# Patient Record
Sex: MALE | Race: WHITE | NOT HISPANIC OR LATINO | URBAN - METROPOLITAN AREA
[De-identification: names, ages, dates, MRNs, and addresses within clinical notes are randomized per-mention and may not be internally consistent; named-entity substitution may affect disease eponyms.]

---

## 2019-08-19 ENCOUNTER — EMERGENCY (EMERGENCY)
Facility: HOSPITAL | Age: 29
LOS: 0 days | Discharge: HOME | End: 2019-08-19
Attending: EMERGENCY MEDICINE | Admitting: EMERGENCY MEDICINE
Payer: MEDICAID

## 2019-08-19 VITALS
DIASTOLIC BLOOD PRESSURE: 81 MMHG | OXYGEN SATURATION: 97 % | SYSTOLIC BLOOD PRESSURE: 141 MMHG | TEMPERATURE: 98 F | HEART RATE: 118 BPM | RESPIRATION RATE: 18 BRPM

## 2019-08-19 VITALS — HEART RATE: 100 BPM
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
## 2019-08-19 DIAGNOSIS — S99.919A UNSPECIFIED INJURY OF UNSPECIFIED ANKLE, INITIAL ENCOUNTER: ICD-10-CM
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
## 2019-08-19 DIAGNOSIS — S82.892A OTHER FRACTURE OF LEFT LOWER LEG, INITIAL ENCOUNTER FOR CLOSED FRACTURE: ICD-10-CM
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
## 2019-08-19 PROCEDURE — 99284 EMERGENCY DEPT VISIT MOD MDM: CPT

## 2019-08-19 NOTE — ED ADULT NURSE NOTE - OBJECTIVE STATEMENT
Patient c/o left ankle pain x3 weeks. Patient fell at work and pain increased. Cast present to left foot unable to assess cap refill, CMS.Cast placed at urgent care- patient was told he had left ankle fracture.

## 2019-08-19 NOTE — ED PROVIDER NOTE - PROVIDER TOKENS
PROVIDER:[TOKEN:[[id number]],FOLLOWUP:[1-3 Days]],PROVIDER:[TOKEN:[[id number]],FOLLOWUP:[1-3 Days]],PROVIDER:[TOKEN:[[id number]],FOLLOWUP:[1-3 Days]]

## 2019-08-19 NOTE — ED PROVIDER NOTE - ATTENDING CONTRIBUTION TO CARE
HPI-As noted above, interviewed the patient myself & agreed w/ findings, additionally: 29M with no significant pmh presents with L ankle fx that was diagnosed out pt, and was sent to f.u with ortho. Pt came to the ER due to lack compatibility with the orthopedic insurance. Currently pt has not pain or any other complaints.     ROS- As noted above     Vital Signs: I have reviewed the initial VS.     Musculoskeletal: Left ankle- in splint, distal pulses and sensation present,     Pt has been screened and deemed not to have any emergencies currently. Pt will need outpt ortho followup.     dc Home with ortho f.u

## 2019-08-19 NOTE — ED ADULT NURSE NOTE - CHPI ED NUR SYMPTOMS NEG
no numbness/no back pain/no bruising/no deformity/no fever/no stiffness/no abrasion/no tingling/no weakness

## 2019-08-19 NOTE — ED PROVIDER NOTE - OBJECTIVE STATEMENT
29M with no significant pmh presents with L ankle fx diagnosed at [facility] [place]. PT referred to orthopedics but they did not accept his insurance and so he reported here. Denies numbness, coldness in extremities, other complaints.

## 2019-08-19 NOTE — ED PROVIDER NOTE - PHYSICAL EXAMINATION
CONSTITUTIONAL: Well-developed; well-nourished; in no acute distress.   SKIN: warm, dry  HEAD: Normocephalic; atraumatic.  EYES: PERRL, EOMI, no conjunctival erythema  ENT: No nasal discharge; airway clear.  NECK: Supple; non tender.  CARD: S1, S2 normal; no murmurs, gallops, or rubs. Regular rate and rhythm.   RESP: No wheezes, rales or rhonchi.  ABD: soft ntnd  EXT: Normal ROM.  No clubbing, cyanosis or edema. Splint in place over L ankle. distal sensation, motor function and pulses 2+ bilaterally in pt and dp  LYMPH: No acute cervical adenopathy.  NEURO: Alert, oriented, grossly unremarkable  PSYCH: Cooperative, appropriate.

## 2019-08-19 NOTE — ED PROVIDER NOTE - CARE PROVIDER_API CALL
Christo Castillo)  Orthopaedic Surgery  [address], [address]  [place] [address]  Phone: [phone]  Fax: [phone]  Follow Up Time: 1-3 Days    Krysta Garcia)  Orthopaedic Surgery  [address] [place] [address]  Phone: [phone]  Fax: [phone]  Follow Up Time: 1-3 Days    Grant Alves)  Orthopaedic Surgery; Sports Medicine  [address] [place] [address]  Phone: [phone]  Fax: [phone]  Follow Up Time: 1-3 Days

## 2019-08-19 NOTE — ED PROVIDER NOTE - CARE PROVIDERS DIRECT ADDRESSES
,DirectAddress_Unknown,[email or web address],DirectAddress_Unknown

## 2019-08-23 ENCOUNTER — INPATIENT (INPATIENT)
Facility: HOSPITAL | Age: 29
LOS: 3 days | Discharge: HOME | End: 2019-08-27
Attending: INTERNAL MEDICINE | Admitting: INTERNAL MEDICINE

## 2019-08-23 VITALS
WEIGHT: 199.96 LBS | DIASTOLIC BLOOD PRESSURE: 82 MMHG | HEART RATE: 83 BPM | RESPIRATION RATE: 16 BRPM | SYSTOLIC BLOOD PRESSURE: 142 MMHG | HEIGHT: 72 IN | TEMPERATURE: 98 F

## 2019-08-23 DIAGNOSIS — G47.00 INSOMNIA, UNSPECIFIED: ICD-10-CM

## 2019-08-23 DIAGNOSIS — F41.9 ANXIETY DISORDER, UNSPECIFIED: ICD-10-CM

## 2019-08-23 DIAGNOSIS — F15.20 OTHER STIMULANT DEPENDENCE, UNCOMPLICATED: ICD-10-CM

## 2019-08-23 DIAGNOSIS — F90.9 ATTENTION-DEFICIT HYPERACTIVITY DISORDER, UNSPECIFIED TYPE: ICD-10-CM

## 2019-08-23 DIAGNOSIS — F11.20 OPIOID DEPENDENCE, UNCOMPLICATED: ICD-10-CM

## 2019-08-23 DIAGNOSIS — S82.892A OTHER FRACTURE OF LEFT LOWER LEG, INITIAL ENCOUNTER FOR CLOSED FRACTURE: ICD-10-CM

## 2019-08-23 DIAGNOSIS — F13.20 SEDATIVE, HYPNOTIC OR ANXIOLYTIC DEPENDENCE, UNCOMPLICATED: ICD-10-CM

## 2019-08-23 DIAGNOSIS — I10 ESSENTIAL (PRIMARY) HYPERTENSION: ICD-10-CM

## 2019-08-23 DIAGNOSIS — S82.892A OTHER FRACTURE OF LEFT LOWER LEG, INITIAL ENCOUNTER FOR CLOSED FRACTURE: Chronic | ICD-10-CM

## 2019-08-23 LAB
ALBUMIN SERPL ELPH-MCNC: 4.8 G/DL — SIGNIFICANT CHANGE UP (ref 3.5–5.2)
ALP SERPL-CCNC: 63 U/L — SIGNIFICANT CHANGE UP (ref 30–115)
ALT FLD-CCNC: 150 U/L — HIGH (ref 0–41)
AMPHET UR-MCNC: NEGATIVE — SIGNIFICANT CHANGE UP
ANION GAP SERPL CALC-SCNC: 14 MMOL/L — SIGNIFICANT CHANGE UP (ref 7–14)
APPEARANCE UR: ABNORMAL
AST SERPL-CCNC: 100 U/L — HIGH (ref 0–41)
BACTERIA # UR AUTO: ABNORMAL
BARBITURATES UR SCN-MCNC: NEGATIVE — SIGNIFICANT CHANGE UP
BASOPHILS # BLD AUTO: 0.07 K/UL — SIGNIFICANT CHANGE UP (ref 0–0.2)
BASOPHILS NFR BLD AUTO: 1.2 % — HIGH (ref 0–1)
BENZODIAZ UR-MCNC: POSITIVE
BILIRUB SERPL-MCNC: 0.8 MG/DL — SIGNIFICANT CHANGE UP (ref 0.2–1.2)
BILIRUB UR-MCNC: ABNORMAL
BUN SERPL-MCNC: 11 MG/DL — SIGNIFICANT CHANGE UP (ref 10–20)
CALCIUM SERPL-MCNC: 9.8 MG/DL — SIGNIFICANT CHANGE UP (ref 8.5–10.1)
CHLORIDE SERPL-SCNC: 97 MMOL/L — LOW (ref 98–110)
CHOLEST SERPL-MCNC: 212 MG/DL — HIGH (ref 100–200)
CO2 SERPL-SCNC: 27 MMOL/L — SIGNIFICANT CHANGE UP (ref 17–32)
COCAINE METAB.OTHER UR-MCNC: NEGATIVE — SIGNIFICANT CHANGE UP
COLOR SPEC: YELLOW — SIGNIFICANT CHANGE UP
COMMENT - URINE: SIGNIFICANT CHANGE UP
CREAT SERPL-MCNC: 1 MG/DL — SIGNIFICANT CHANGE UP (ref 0.7–1.5)
DIFF PNL FLD: NEGATIVE — SIGNIFICANT CHANGE UP
DRUG SCREEN 1, URINE RESULT: SIGNIFICANT CHANGE UP
EOSINOPHIL # BLD AUTO: 0.03 K/UL — SIGNIFICANT CHANGE UP (ref 0–0.7)
EOSINOPHIL NFR BLD AUTO: 0.5 % — SIGNIFICANT CHANGE UP (ref 0–8)
EPI CELLS # UR: ABNORMAL /HPF
ESTIMATED AVERAGE GLUCOSE: 117 MG/DL — HIGH (ref 68–114)
GGT SERPL-CCNC: 15 U/L — SIGNIFICANT CHANGE UP (ref 1–40)
GLUCOSE SERPL-MCNC: 78 MG/DL — SIGNIFICANT CHANGE UP (ref 70–99)
GLUCOSE UR QL: NEGATIVE MG/DL — SIGNIFICANT CHANGE UP
HBA1C BLD-MCNC: 5.7 % — HIGH (ref 4–5.6)
HCT VFR BLD CALC: 41 % — LOW (ref 42–52)
HDLC SERPL-MCNC: 23 MG/DL — LOW
HGB BLD-MCNC: 13.7 G/DL — LOW (ref 14–18)
IMM GRANULOCYTES NFR BLD AUTO: 0.3 % — SIGNIFICANT CHANGE UP (ref 0.1–0.3)
KETONES UR-MCNC: 160
LEUKOCYTE ESTERASE UR-ACNC: NEGATIVE — SIGNIFICANT CHANGE UP
LIPID PNL WITH DIRECT LDL SERPL: 170 MG/DL — HIGH (ref 4–129)
LYMPHOCYTES # BLD AUTO: 2.04 K/UL — SIGNIFICANT CHANGE UP (ref 1.2–3.4)
LYMPHOCYTES # BLD AUTO: 34.6 % — SIGNIFICANT CHANGE UP (ref 20.5–51.1)
MAGNESIUM SERPL-MCNC: 1.9 MG/DL — SIGNIFICANT CHANGE UP (ref 1.8–2.4)
MCHC RBC-ENTMCNC: 26.6 PG — LOW (ref 27–31)
MCHC RBC-ENTMCNC: 33.4 G/DL — SIGNIFICANT CHANGE UP (ref 32–37)
MCV RBC AUTO: 79.6 FL — LOW (ref 80–94)
METHADONE UR-MCNC: NEGATIVE — SIGNIFICANT CHANGE UP
MONOCYTES # BLD AUTO: 0.36 K/UL — SIGNIFICANT CHANGE UP (ref 0.1–0.6)
MONOCYTES NFR BLD AUTO: 6.1 % — SIGNIFICANT CHANGE UP (ref 1.7–9.3)
NEUTROPHILS # BLD AUTO: 3.38 K/UL — SIGNIFICANT CHANGE UP (ref 1.4–6.5)
NEUTROPHILS NFR BLD AUTO: 57.3 % — SIGNIFICANT CHANGE UP (ref 42.2–75.2)
NITRITE UR-MCNC: NEGATIVE — SIGNIFICANT CHANGE UP
NRBC # BLD: 0 /100 WBCS — SIGNIFICANT CHANGE UP (ref 0–0)
OPIATES UR-MCNC: NEGATIVE — SIGNIFICANT CHANGE UP
PCP UR-MCNC: NEGATIVE — SIGNIFICANT CHANGE UP
PH UR: 6.5 — SIGNIFICANT CHANGE UP (ref 5–8)
PLATELET # BLD AUTO: 190 K/UL — SIGNIFICANT CHANGE UP (ref 130–400)
POTASSIUM SERPL-MCNC: 4.3 MMOL/L — SIGNIFICANT CHANGE UP (ref 3.5–5)
POTASSIUM SERPL-SCNC: 4.3 MMOL/L — SIGNIFICANT CHANGE UP (ref 3.5–5)
PROPOXYPHENE QUALITATIVE URINE RESULT: NEGATIVE — SIGNIFICANT CHANGE UP
PROT SERPL-MCNC: 7.4 G/DL — SIGNIFICANT CHANGE UP (ref 6–8)
PROT UR-MCNC: 100 MG/DL
RBC # BLD: 5.15 M/UL — SIGNIFICANT CHANGE UP (ref 4.7–6.1)
RBC # FLD: 13.4 % — SIGNIFICANT CHANGE UP (ref 11.5–14.5)
RBC CASTS # UR COMP ASSIST: ABNORMAL /HPF
SODIUM SERPL-SCNC: 138 MMOL/L — SIGNIFICANT CHANGE UP (ref 135–146)
SP GR SPEC: 1.02 — SIGNIFICANT CHANGE UP (ref 1.01–1.03)
THC UR QL: NEGATIVE — SIGNIFICANT CHANGE UP
TOTAL CHOLESTEROL/HDL RATIO MEASUREMENT: 9.2 RATIO — HIGH (ref 4–5.5)
TRIGL SERPL-MCNC: 213 MG/DL — HIGH (ref 10–149)
UROBILINOGEN FLD QL: 1 MG/DL (ref 0.2–0.2)
WBC # BLD: 5.9 K/UL — SIGNIFICANT CHANGE UP (ref 4.8–10.8)
WBC # FLD AUTO: 5.9 K/UL — SIGNIFICANT CHANGE UP (ref 4.8–10.8)
WBC UR QL: SIGNIFICANT CHANGE UP /HPF

## 2019-08-23 RX ORDER — FOLIC ACID 0.8 MG
1 TABLET ORAL DAILY
Refills: 0 | Status: DISCONTINUED | OUTPATIENT
Start: 2019-08-23 | End: 2019-08-27

## 2019-08-23 RX ORDER — PHENOBARBITAL 60 MG
32.4 TABLET ORAL EVERY 6 HOURS
Refills: 0 | Status: DISCONTINUED | OUTPATIENT
Start: 2019-08-25 | End: 2019-08-25

## 2019-08-23 RX ORDER — BUPRENORPHINE AND NALOXONE 2; .5 MG/1; MG/1
1 TABLET SUBLINGUAL
Refills: 0 | Status: DISCONTINUED | OUTPATIENT
Start: 2019-08-23 | End: 2019-08-27

## 2019-08-23 RX ORDER — GUAIFENESIN/DEXTROMETHORPHAN 600MG-30MG
5 TABLET, EXTENDED RELEASE 12 HR ORAL EVERY 4 HOURS
Refills: 0 | Status: DISCONTINUED | OUTPATIENT
Start: 2019-08-23 | End: 2019-08-27

## 2019-08-23 RX ORDER — MULTIVIT-MIN/FERROUS GLUCONATE 9 MG/15 ML
1 LIQUID (ML) ORAL DAILY
Refills: 0 | Status: DISCONTINUED | OUTPATIENT
Start: 2019-08-23 | End: 2019-08-27

## 2019-08-23 RX ORDER — PHENOBARBITAL 60 MG
32.4 TABLET ORAL EVERY 4 HOURS
Refills: 0 | Status: DISCONTINUED | OUTPATIENT
Start: 2019-08-23 | End: 2019-08-27

## 2019-08-23 RX ORDER — PSEUDOEPHEDRINE HCL 30 MG
60 TABLET ORAL EVERY 6 HOURS
Refills: 0 | Status: DISCONTINUED | OUTPATIENT
Start: 2019-08-23 | End: 2019-08-27

## 2019-08-23 RX ORDER — PHENOBARBITAL 60 MG
32.4 TABLET ORAL EVERY 6 HOURS
Refills: 0 | Status: DISCONTINUED | OUTPATIENT
Start: 2019-08-23 | End: 2019-08-25

## 2019-08-23 RX ORDER — METHOCARBAMOL 500 MG/1
500 TABLET, FILM COATED ORAL EVERY 6 HOURS
Refills: 0 | Status: DISCONTINUED | OUTPATIENT
Start: 2019-08-23 | End: 2019-08-27

## 2019-08-23 RX ORDER — PHENOBARBITAL 60 MG
TABLET ORAL
Refills: 0 | Status: COMPLETED | OUTPATIENT
Start: 2019-08-23 | End: 2019-08-27

## 2019-08-23 RX ORDER — IBUPROFEN 200 MG
400 TABLET ORAL EVERY 6 HOURS
Refills: 0 | Status: DISCONTINUED | OUTPATIENT
Start: 2019-08-23 | End: 2019-08-27

## 2019-08-23 RX ORDER — ACETAMINOPHEN 500 MG
650 TABLET ORAL EVERY 4 HOURS
Refills: 0 | Status: DISCONTINUED | OUTPATIENT
Start: 2019-08-23 | End: 2019-08-27

## 2019-08-23 RX ORDER — HYDROXYZINE HCL 10 MG
50 TABLET ORAL EVERY 6 HOURS
Refills: 0 | Status: DISCONTINUED | OUTPATIENT
Start: 2019-08-23 | End: 2019-08-27

## 2019-08-23 RX ORDER — THIAMINE MONONITRATE (VIT B1) 100 MG
100 TABLET ORAL DAILY
Refills: 0 | Status: COMPLETED | OUTPATIENT
Start: 2019-08-23 | End: 2019-08-26

## 2019-08-23 RX ORDER — HYDROXYZINE HCL 10 MG
100 TABLET ORAL AT BEDTIME
Refills: 0 | Status: DISCONTINUED | OUTPATIENT
Start: 2019-08-23 | End: 2019-08-27

## 2019-08-23 RX ORDER — MAGNESIUM HYDROXIDE 400 MG/1
30 TABLET, CHEWABLE ORAL ONCE
Refills: 0 | Status: DISCONTINUED | OUTPATIENT
Start: 2019-08-23 | End: 2019-08-27

## 2019-08-23 RX ORDER — PHENOBARBITAL 60 MG
32.4 TABLET ORAL EVERY 12 HOURS
Refills: 0 | Status: DISCONTINUED | OUTPATIENT
Start: 2019-08-26 | End: 2019-08-27

## 2019-08-23 RX ADMIN — BUPRENORPHINE AND NALOXONE 1 TABLET(S): 2; .5 TABLET SUBLINGUAL at 21:15

## 2019-08-23 RX ADMIN — Medication 100 MILLIGRAM(S): at 21:18

## 2019-08-23 RX ADMIN — Medication 32.4 MILLIGRAM(S): at 23:57

## 2019-08-23 RX ADMIN — Medication 400 MILLIGRAM(S): at 14:54

## 2019-08-23 RX ADMIN — Medication 32.4 MILLIGRAM(S): at 17:35

## 2019-08-23 RX ADMIN — Medication 400 MILLIGRAM(S): at 14:55

## 2019-08-23 NOTE — H&P ADULT - HISTORY OF PRESENT ILLNESS
This is a 28 Y/O white Male with hx of Continuous Benzo & Amphetamine Dependency, on Suboxone RX, pt referred to Intake by  [facility] for Urine Toxicology (+) for Benzo in 8/22/2019, according to [facility] staff ,pt MUST be detoxed of his Klonopin in order to return back to their Rehab.  Hx of one Detox in [place] in 2018, was clean up to 4 Months ago. Pt presented to Intake with (L) Ankle Fracture, pt needs to F/U with scheduled  appointment in 9/10/2019  Ortho Grant Waldron   [phone] and with Surgery  Krysta Light  [phone] after D/C as  an Outpatient .    DRUG	AGE OF ONSET	ROUTE	FREQ	AMOUNT	LAST USE	LENGTH OF CURRENT USE	  KLONOPIN	26 Y/O	PO	Daily	2-4 mg	8/22/19    2 mg	4 Months	  SUBOXONE  8/2 mg	26 Y/O	SL	Daily	1 Film BID	8/23/19  1 Film	2 Years	  RITALIN   20 mg	28 Y/O	PO	Daily	 1 tablet	8/21/19   20 mg	3 Months	  							  							    I-Stop: Was the prescriber informed by Intake clinician:                Dr. Gomez will meet with Patint in detox unit in Monday 8/26/2019 07/24/2019	08/21/2019	zolpidem tartrate 10 mg tablet 	30	30	Laura Gomez MD	[facility]	  08/10/2019	08/10/2019	suboxone 8 mg-2 mg sl film 	60	30	Valerio Clemente MD	[id number]	  07/24/2019	07/26/2019	tramadol hcl 50 mg tablet 	28	14	Laura Gomez MD	[facility]	  07/24/2019	07/24/2019	methylphenidate 20 mg tablet 	30	30	Laura Gomez MD			  Benzo W/D HX:    (+) Hot &cold Flashes, (+) Anxiety, (+) Insomnia, (+) Body aches,(+)Poor Appetite, (-)  Seizure   Hx of  Overdose :     NO                                            Hx x of Withdrawal Seizures: NO                            MMTP :      NO       PSYCH. Hx :    Anxiety,ADHD,and Insomnia                          Denies any S/H Ideation or A/V Hallucination    Screening history	Last tested	Result	History of treatment	  HIV	2018	NEG	N/A	  Hepatitis C	2018	NEG	N/A	  Quantiferon GOLD TB test	2018	NEG	N/A	    Immunization	Not Received	Unknown	Received	Date Received 	  Influenza			X	2/2019	  Pneumococcus		X			  Tetanus			X	>10 years	  Others					  					    Patient  denied  any Chest Pain, SOB, Abdominal Pain, HA, Blurry Vision, Bleeding ,or Dysuria

## 2019-08-23 NOTE — H&P ADULT - NSHPPHYSICALEXAM_GEN_ALL_CORE
-  Vital Signs:      Temp:  98.7    Pulse:  76     RR:  14     BP:   140/84                        Constitutional: anxious A&Ox3, WD/WN  Eyes: PERRLA  Respiratory: +air entry, no rales, no rhonchi, no wheezes  Cardiovascular: +S1 and S2,RRR  Gastrointestinal: +BS, soft, non-tender, not distended, No CVAT  Extremities: no cyanosis, no edema, no calf tenderness, + (L) Ankle Fx with Brace in place  Vascular: +dorsal pedis and radial pulses, no extremity cyanosis  Neurological: sensation intact, ROM equal B/L, CN II-XII intact, Gait: steady  Skin: no rashes, normal turgor, No track marks   No Decubiti present  No IV lines present  Rectal/Breasts Exam: Deferred

## 2019-08-23 NOTE — H&P ADULT - NSICDXPASTMEDICALHX_GEN_ALL_CORE_FT
PAST MEDICAL HISTORY:  ADHD     Ankle fracture, left 8/20/2019    Anxiety     HTN (hypertension)     Insomnia     Opioid dependence on agonist therapy

## 2019-08-23 NOTE — H&P ADULT - PROBLEM SELECTOR PLAN 8
Observation  Atarax 50 mg po Q6H  PRN anxiety  Atarax 100 mg po QHS PRN Insomnia  Psych. Consult Prn  D/C Ritalin 20 mg po daily

## 2019-08-23 NOTE — H&P ADULT - ASSESSMENT
This is a 30 Y/O white Male with hx of Continuous Benzo & Amphetamine Dependency, on Suboxone RX, pt referred to Intake by  [facility] for Urine Toxicology (+) for Benzo in 8/22/2019, according to [facility] staff ,pt MUST be detoxed of his Klonopin in order to return back to their Rehab.  Hx of one Detox in [place] in 2018, was clean up to 4 Months ago. Pt presented to Intake with (L) Ankle Fracture, pt needs to F/U with scheduled  appointment in 9/10/2019  Ortho Grant Waldron   [phone] and with Surgery  Krysta Light  [phone] after D/C as  an Outpatient .

## 2019-08-23 NOTE — H&P ADULT - PROBLEM SELECTOR PLAN 2
Check Urine Toxicology  Phenobarbital Taper Protocol ( Normal)  Monitor VS and withdrawal symptoms  PRN Medications  Notify Dr. Gomez

## 2019-08-23 NOTE — H&P ADULT - PROBLEM SELECTOR PLAN 5
Analgesic PRN  Pt may use own (L) Ankle Brace  pt needs to F/U with scheduled  appointment in 9/10/2019  Ortho Grant Waldron   [phone] and with Surgery  Krysta Light  [phone] after D/C asan Outpatient .

## 2019-08-23 NOTE — H&P ADULT - PROBLEM SELECTOR PLAN 6
Observation  Atarax 50 mg po Q6H  PRN anxiety  Atarax 100 mg po QHS PRN Insomnia  Psych. Consult Prn  D/C Klonopin 1 mg po BID

## 2019-08-23 NOTE — H&P ADULT - PROBLEM SELECTOR PLAN 4
Heart Healthy Diet  Monitor BP q6h,Rx accordingly  Clonidine 0.1mg PO Q6H PRN for BP >140/90  F/U after D/C as an Outpatient .

## 2019-08-23 NOTE — H&P ADULT - ATTENDING COMMENTS
Patient interviewed and examined.    Chart reviewed.    PA's H&P noted and modified, as appropriate.    Case discussed on team rounds    Following is my summary of the case.    Admitted for detox: from __x__ED, ___Intake, ____Med/Surg Floor    Alcohol____   Opioid__x___  Benzo___ Other_____  x  Substance amount, duration of use, last usage, and prior attempts at detox or rehabs, are outlined above in the H&P and discussed with patient.    Associated withdrawal symptoms presents.  Comorbid conditions noted. Chronic and Stable.    Past Medical Hx, Psych Hx, family Hx, Social Hx from H&P reviewed and NO changes.    Old medical record and medication Hx. Reviewed    Following items reviewed and addressed:  1. labs  2. EKG  3. Imaging from PACs module    Examination: no change from PA's exam.    Place on following protocol  _____Medically Managed  __X__Medically Supervised    Ciwa_____Librium taper____Ativan taper___Methadone taper___ Phenobarb taper____ Suboxone Induction____MMTP____    Narcan Kit Offered    Psych Consult __X__N/A  ___Ordered    Physical Therapy  ___X n/a   ___  Ordered    Aftercare disposition to be addressed by counselors.    Estimated length of stay 3-5 days. Patient interviewed and examined.    Chart reviewed.    PA's H&P noted and modified, as appropriate.    Case discussed on team rounds    Following is my summary of the case.    Admitted for detox: from ____ED, _x__Intake, ____Med/Surg Floor    Alcohol____   Opioid__x___  Benzo_x__ Other__x___  x  Substance amount, duration of use, last usage, and prior attempts at detox or rehabs, are outlined above in the H&P and discussed with patient.    Associated withdrawal symptoms presents.  Comorbid conditions noted. Chronic and Stable.    Past Medical Hx, Psych Hx, family Hx, Social Hx from H&P reviewed and NO changes.    Old medical record and medication Hx. Reviewed    Following items reviewed and addressed:  1. labs  2. EKG  3. Imaging from PACs module    Examination: no change from PA's exam.    Place on following protocol  _____Medically Managed  __X__Medically Supervised    Ciwa_____Librium taper____Ativan taper___Methadone taper___ Phenobarb taper__x__ Suboxone Induction____MMTP____    Narcan Kit Offered    Psych Consult __X__N/A  ___Ordered    Physical Therapy  ___X n/a   ___  Ordered    Aftercare disposition to be addressed by counselors.    Estimated length of stay 3-5 days.

## 2019-08-23 NOTE — H&P ADULT - PROBLEM SELECTOR PLAN 7
Observation  Atarax 50 mg po Q6H  PRN anxiety  Atarax 100 mg po QHS PRN Insomnia  Psych. Consult Prn  D/C Ambien 10 mg po Q Hs

## 2019-08-24 LAB
HAV IGM SER-ACNC: SIGNIFICANT CHANGE UP
HBV CORE IGM SER-ACNC: SIGNIFICANT CHANGE UP
HBV SURFACE AG SER-ACNC: SIGNIFICANT CHANGE UP
HCV AB S/CO SERPL IA: 0.08 S/CO — SIGNIFICANT CHANGE UP (ref 0–0.99)
HCV AB SERPL-IMP: SIGNIFICANT CHANGE UP
HIV 1+2 AB+HIV1 P24 AG SERPL QL IA: SIGNIFICANT CHANGE UP

## 2019-08-24 RX ADMIN — BUPRENORPHINE AND NALOXONE 1 TABLET(S): 2; .5 TABLET SUBLINGUAL at 08:28

## 2019-08-24 RX ADMIN — Medication 32.4 MILLIGRAM(S): at 06:41

## 2019-08-24 RX ADMIN — Medication 100 MILLIGRAM(S): at 08:27

## 2019-08-24 RX ADMIN — Medication 32.4 MILLIGRAM(S): at 11:09

## 2019-08-24 RX ADMIN — Medication 400 MILLIGRAM(S): at 21:31

## 2019-08-24 RX ADMIN — Medication 1 MILLIGRAM(S): at 08:28

## 2019-08-24 RX ADMIN — BUPRENORPHINE AND NALOXONE 1 TABLET(S): 2; .5 TABLET SUBLINGUAL at 21:30

## 2019-08-24 RX ADMIN — Medication 400 MILLIGRAM(S): at 22:05

## 2019-08-24 RX ADMIN — Medication 32.4 MILLIGRAM(S): at 17:30

## 2019-08-24 RX ADMIN — Medication 1 TABLET(S): at 08:27

## 2019-08-25 RX ADMIN — BUPRENORPHINE AND NALOXONE 1 TABLET(S): 2; .5 TABLET SUBLINGUAL at 21:13

## 2019-08-25 RX ADMIN — Medication 400 MILLIGRAM(S): at 21:13

## 2019-08-25 RX ADMIN — Medication 32.4 MILLIGRAM(S): at 13:06

## 2019-08-25 RX ADMIN — BUPRENORPHINE AND NALOXONE 1 TABLET(S): 2; .5 TABLET SUBLINGUAL at 08:26

## 2019-08-25 RX ADMIN — Medication 32.4 MILLIGRAM(S): at 00:27

## 2019-08-25 RX ADMIN — Medication 1 MILLIGRAM(S): at 08:27

## 2019-08-25 RX ADMIN — Medication 100 MILLIGRAM(S): at 08:27

## 2019-08-25 RX ADMIN — Medication 32.4 MILLIGRAM(S): at 18:00

## 2019-08-25 RX ADMIN — Medication 32.4 MILLIGRAM(S): at 06:10

## 2019-08-25 RX ADMIN — Medication 400 MILLIGRAM(S): at 21:45

## 2019-08-25 RX ADMIN — Medication 1 TABLET(S): at 08:27

## 2019-08-25 NOTE — CHART NOTE - NSCHARTNOTEFT_GEN_A_CORE
Subsequent Inpatient Encounter                                       Detox Unit    TUSHAR BOOKER   29y   Male      Chief Complaint:    Follow up for Polysubstance  Dependency    HPI:     I reviewed previous notes. No Change, except if noted below.             Detail:_    ROS:   I reviewed with patient.  No changes from previous notes except if noted below.             Detail: _    PFSH I reviewed with patient. No changes from previous notes except if noted below.             Detail_    Medication reconciliation performed.    MEDICATIONS  (STANDING):  buprenorphine 8 mG/naloxone 2 mG SL  Tablet 1 Tablet(s) SubLingual <User Schedule>  folic acid 1 milliGRAM(s) Oral daily  multivitamin/minerals 1 Tablet(s) Oral daily  PHENobarbital 32.4 milliGRAM(s) Oral every 6 hours  PHENobarbital   Oral   thiamine 100 milliGRAM(s) Oral daily      MEDICATIONS  (PRN):  acetaminophen   Tablet .. 650 milliGRAM(s) Oral every 4 hours PRN Temp greater or equal to 38.5C (101.3F)  aluminum hydroxide/magnesium hydroxide/simethicone Suspension 30 milliLiter(s) Oral every 6 hours PRN Heartburn  bismuth subsalicylate Liquid 30 milliLiter(s) Oral every 6 hours PRN Diarrhea  cloNIDine 0.1 milliGRAM(s) Oral every 8 hours PRN Blood Pressure GREATER THAN 140/90 mmHG  guaiFENesin/dextromethorphan  Syrup 5 milliLiter(s) Oral every 4 hours PRN Cough  hydrOXYzine hydrochloride 50 milliGRAM(s) Oral every 6 hours PRN Anxiety  hydrOXYzine hydrochloride 100 milliGRAM(s) Oral at bedtime PRN insomnia  ibuprofen  Tablet. 400 milliGRAM(s) Oral every 6 hours PRN Mild Pain (1 - 3)  magnesium hydroxide Suspension 30 milliLiter(s) Oral once PRN Constipation  methocarbamol 500 milliGRAM(s) Oral every 6 hours PRN muscle pain  PHENobarbital 32.4 milliGRAM(s) Oral every 4 hours PRN Withdrawal  pseudoephedrine 60 milliGRAM(s) Oral every 6 hours PRN Rhinitis  trimethobenzamide 300 milliGRAM(s) Oral every 6 hours PRN Nausea and/or Vomiting  trimethobenzamide Injectable 200 milliGRAM(s) IntraMuscular every 6 hours PRN Nausea and/or Vomiting      T(C): 36.5 (19 @ 06:00), Max: 37 (19 @ 18:04)  HR: 51 (19 @ 06:00) (51 - 72)  BP: 107/54 (19 @ 06:00) (107/54 - 145/78)  RR: 16 (19 @ 06:00) (16 - 18)  SpO2: --    PHYSICAL EXAM:      Constitutional: NAD, A&O x3    Eyes: PERRLA, no conjuctivitis    Neck: no lymphadenopathy    Respiratory: +air entry, no rales, no rhonchi, no wheezes    Cardiovascular: +S1 and S2, regular rate and rhythm    Gastrointestinal: +BS, soft, non-tender, not distended    Extremities:  no edema, no calf tenderness    Skin: no rashes, normal turgor                            13.7   5.90  )-----------( 190      ( 23 Aug 2019 16:27 )             41.0       138  |  97<L>  |  11  ----------------------------<  78  4.3   |  27  |  1.0    Ca    9.8      23 Aug 2019 16:27  Mg     1.9         TPro  7.4  /  Alb  4.8  /  TBili  0.8  /  DBili  x   /  AST  100<H>  /  ALT  150<H>  /  AlkPhos  63      Magnesium, Serum: 1.9 mg/dL (19 @ 16:27)  Hemoglobin A1C, Whole Blood: 5.7 % (19 @ 16:27)  Hepatitis B Surface Antigen: Nonreact (19 @ 16:27)  Hepatitis C Virus S/CO Ratio: 0.08 S/CO (19 @ 16:27)    Hepatitis C Virus Interpretation: Nonreact (19 @ 16:27)      Urinalysis Basic - ( 23 Aug 2019 14:31 )    Color: Yellow / Appearance: Slightly Cloudy / S.025 / pH: x  Gluc: x / Ketone: 160  / Bili: Moderate / Urobili: 1.0 mg/dL   Blood: x / Protein: 100 mg/dL / Nitrite: Negative   Leuk Esterase: Negative / RBC: 3-5 /HPF / WBC 1-2 /HPF   Sq Epi: x / Non Sq Epi: Occasional /HPF / Bacteria: Few    Drug Screen 1, Urine Result: Done (19 @ 14:31)        Impression and Plan:    Primary Diagnosis:  Benzo/Opiate Dependency                                Medication: Pheno Protocol on suboxone    Secondary Diagnosis:                                                                                Medication:    Tertiary Diagnosis:                                                                                     Medication:      Continue Detox Protocols. Use of PRNS as needed for withdrawal and comfort.    Adjustments to protocols:    Labs/ Tests reviewed.    Tests ordered:     Likely Disposition: _X__Home       ___Rehab       ___Outpatient Program    ___Self Help     _____Other    Estimated Length of stay:_5___

## 2019-08-26 RX ORDER — BUPRENORPHINE AND NALOXONE 2; .5 MG/1; MG/1
1 TABLET SUBLINGUAL
Qty: 0 | Refills: 0 | DISCHARGE
Start: 2019-08-26

## 2019-08-26 RX ORDER — BUPRENORPHINE AND NALOXONE 2; .5 MG/1; MG/1
1 TABLET SUBLINGUAL
Qty: 0 | Refills: 0 | DISCHARGE

## 2019-08-26 RX ADMIN — Medication 1 TABLET(S): at 09:01

## 2019-08-26 RX ADMIN — BUPRENORPHINE AND NALOXONE 1 TABLET(S): 2; .5 TABLET SUBLINGUAL at 09:01

## 2019-08-26 RX ADMIN — Medication 400 MILLIGRAM(S): at 18:30

## 2019-08-26 RX ADMIN — METHOCARBAMOL 500 MILLIGRAM(S): 500 TABLET, FILM COATED ORAL at 18:30

## 2019-08-26 RX ADMIN — Medication 1 MILLIGRAM(S): at 09:01

## 2019-08-26 RX ADMIN — BUPRENORPHINE AND NALOXONE 1 TABLET(S): 2; .5 TABLET SUBLINGUAL at 21:22

## 2019-08-26 RX ADMIN — Medication 100 MILLIGRAM(S): at 00:33

## 2019-08-26 RX ADMIN — Medication 32.4 MILLIGRAM(S): at 18:29

## 2019-08-26 RX ADMIN — Medication 32.4 MILLIGRAM(S): at 06:32

## 2019-08-26 RX ADMIN — Medication 100 MILLIGRAM(S): at 09:01

## 2019-08-26 NOTE — CHART NOTE - NSCHARTNOTEFT_GEN_A_CORE
Subsequent Inpatient Encounter                                       Detox Unit    TUSHAR BOOKER   29y   Male      Chief Complaint:    Follow up for Polysubstance  Dependency    HPI:     I reviewed previous notes. No Change, except if noted below.             Detail:_    ROS:   I reviewed with patient.  No changes from previous notes except if noted below.             Detail: _    PFSH I reviewed with patient. No changes from previous notes except if noted below.             Detail_    Medication reconciliation performed.    MEDICATIONS  (STANDING):  buprenorphine 8 mG/naloxone 2 mG SL  Tablet 1 Tablet(s) SubLingual <User Schedule>  folic acid 1 milliGRAM(s) Oral daily  multivitamin/minerals 1 Tablet(s) Oral daily  PHENobarbital   Oral   PHENobarbital 32.4 milliGRAM(s) Oral every 12 hours  thiamine 100 milliGRAM(s) Oral daily      MEDICATIONS  (PRN):  acetaminophen   Tablet .. 650 milliGRAM(s) Oral every 4 hours PRN Temp greater or equal to 38.5C (101.3F)  aluminum hydroxide/magnesium hydroxide/simethicone Suspension 30 milliLiter(s) Oral every 6 hours PRN Heartburn  bismuth subsalicylate Liquid 30 milliLiter(s) Oral every 6 hours PRN Diarrhea  cloNIDine 0.1 milliGRAM(s) Oral every 8 hours PRN Blood Pressure GREATER THAN 140/90 mmHG  guaiFENesin/dextromethorphan  Syrup 5 milliLiter(s) Oral every 4 hours PRN Cough  hydrOXYzine hydrochloride 50 milliGRAM(s) Oral every 6 hours PRN Anxiety  hydrOXYzine hydrochloride 100 milliGRAM(s) Oral at bedtime PRN insomnia  ibuprofen  Tablet. 400 milliGRAM(s) Oral every 6 hours PRN Mild Pain (1 - 3)  magnesium hydroxide Suspension 30 milliLiter(s) Oral once PRN Constipation  methocarbamol 500 milliGRAM(s) Oral every 6 hours PRN muscle pain  PHENobarbital 32.4 milliGRAM(s) Oral every 4 hours PRN Withdrawal  pseudoephedrine 60 milliGRAM(s) Oral every 6 hours PRN Rhinitis  trimethobenzamide 300 milliGRAM(s) Oral every 6 hours PRN Nausea and/or Vomiting  trimethobenzamide Injectable 200 milliGRAM(s) IntraMuscular every 6 hours PRN Nausea and/or Vomiting      T(C): 35.8 (08-26-19 @ 06:00), Max: 36.6 (08-25-19 @ 18:04)  HR: 67 (08-26-19 @ 06:00) (55 - 67)  BP: 112/54 (08-26-19 @ 06:00) (112/54 - 130/78)  RR: 14 (08-26-19 @ 06:00) (14 - 16)  SpO2: --    PHYSICAL EXAM:      Constitutional: NAD, A&O x3    Eyes: PERRLA, no conjuctivitis    Neck: no lymphadenopathy    Respiratory: +air entry, no rales, no rhonchi, no wheezes    Cardiovascular: +S1 and S2, regular rate and rhythm    Gastrointestinal: +BS, soft, non-tender, not distended    Extremities:  no edema, no calf tenderness    Skin: no rashes, normal turgor            Magnesium, Serum: 1.9 mg/dL (08-23-19 @ 16:27)  Hemoglobin A1C, Whole Blood: 5.7 % (08-23-19 @ 16:27)  Hepatitis B Surface Antigen: Nonreact (08-23-19 @ 16:27)  Hepatitis C Virus S/CO Ratio: 0.08 S/CO (08-23-19 @ 16:27)    Hepatitis C Virus Interpretation: Nonreact (08-23-19 @ 16:27)      Drug Screen 1, Urine Result: Done (08-23-19 @ 14:31)        Impression and Plan:    Primary Diagnosis:  Benzo/Opiate Dependency                                Medication: Pheno/Methadone Protocol    Secondary Diagnosis:                                                                                Medication:    Tertiary Diagnosis:                                                                                     Medication:      Continue Detox Protocols. Use of PRNS as needed for withdrawal and comfort.    Adjustments to protocols:    Labs/ Tests reviewed.    Tests ordered:     Likely Disposition: _x__Home       ___Rehab       ___Outpatient Program    ___Self Help     _____Other    Estimated Length of stay:_5___

## 2019-08-26 NOTE — CHART NOTE - NSCHARTNOTEFT_GEN_A_CORE
Allergies:  amoxicillin (Hives)      Diet: Regular    Activity: as tolerated    Follow up with    1. PMD in 2 weeks    2. Psych in 2 weeks    3.    Follow up for abnormal labs/tests    1.    Extra Instructions:      Flu Vaccine given  Yes_____         No______      Diagnosis:  Chemical Dependency   Maintain sobriety  refrain from all use      Patient Signature___________________________________________  Date_________________      Nurse Signature_____________________________________________Date_________________

## 2019-08-27 VITALS
TEMPERATURE: 96 F | DIASTOLIC BLOOD PRESSURE: 51 MMHG | RESPIRATION RATE: 16 BRPM | HEART RATE: 50 BPM | SYSTOLIC BLOOD PRESSURE: 91 MMHG

## 2019-08-27 RX ORDER — CLONAZEPAM 1 MG
1 TABLET ORAL
Qty: 0 | Refills: 0 | DISCHARGE

## 2019-08-27 RX ADMIN — Medication 100 MILLIGRAM(S): at 00:38

## 2019-08-27 RX ADMIN — Medication 1 MILLIGRAM(S): at 08:14

## 2019-08-27 RX ADMIN — Medication 32.4 MILLIGRAM(S): at 06:16

## 2019-08-27 RX ADMIN — BUPRENORPHINE AND NALOXONE 1 TABLET(S): 2; .5 TABLET SUBLINGUAL at 08:15

## 2019-08-27 RX ADMIN — Medication 1 TABLET(S): at 08:14

## 2019-08-27 NOTE — CHART NOTE - NSCHARTNOTEFT_GEN_A_CORE
The patient was admitted to the inpt detox unit CDU, for   ETOH____ Opioid__x_  Benzo_x___Polysubstance _____ Dependency.    Pt was admitted from ED____, Intake_x___, Med/surg Floor_______.    Details are present in the preceding History & Physical section and follow up chart notes.  patient was evaluated on daily detox team  rounds.  Withdrawal symptoms and signs were reviewed on a daily basis, and the protocols were adjusted accordingly.    Labs and imaging results were reviewed and discussed with the patient.    All questions from the patient were addressed.  The patient was seen by the Chemical dependency counselors, and different options for after care were discussed.  The patient attended groups, meetings and 1:1 sessions with the counselors.  Narcane Kit was offered and instructions given prior to discharge.    Psychiatry consultation reviewed______, N/A__x____    Physical therapy evaluation reviewed_____, N/A_x___    Pt was given copies of labs and imaging reports, if applicable.    Prescriptions if needed, were sent through [facility] system to the pharmacy amnd are noted in the discharge instruction sheet.    After care was arranged by counselors and pt was discharged to:    Home_x__, Outpt. Program___, Rehab ___, Long term____ [facility] ____ IPP____ SNF____, AMA___, Admin Discharge____    Principal Diagnosis: Alcohol Dependency____ Opioid Dependency_x__ Benzo Dependency__x__ Polysubstance Dependency____

## 2019-08-27 NOTE — CHART NOTE - NSCHARTNOTEFT_GEN_A_CORE
Subsequent Inpatient Encounter                                       Detox Unit    TUSHAR BOOKER   29y   Male      Chief Complaint:    Follow up for Polysubstance  Dependency    HPI:     I reviewed previous notes. No Change, except if noted below.             Detail:_    ROS:   I reviewed with patient.  No changes from previous notes except if noted below.             Detail: _    PFSH I reviewed with patient. No changes from previous notes except if noted below.             Detail_    Medication reconciliation performed.    MEDICATIONS  (STANDING):  buprenorphine 8 mG/naloxone 2 mG SL  Tablet 1 Tablet(s) SubLingual <User Schedule>  folic acid 1 milliGRAM(s) Oral daily  multivitamin/minerals 1 Tablet(s) Oral daily      MEDICATIONS  (PRN):  acetaminophen   Tablet .. 650 milliGRAM(s) Oral every 4 hours PRN Temp greater or equal to 38.5C (101.3F)  aluminum hydroxide/magnesium hydroxide/simethicone Suspension 30 milliLiter(s) Oral every 6 hours PRN Heartburn  bismuth subsalicylate Liquid 30 milliLiter(s) Oral every 6 hours PRN Diarrhea  cloNIDine 0.1 milliGRAM(s) Oral every 8 hours PRN Blood Pressure GREATER THAN 140/90 mmHG  guaiFENesin/dextromethorphan  Syrup 5 milliLiter(s) Oral every 4 hours PRN Cough  hydrOXYzine hydrochloride 50 milliGRAM(s) Oral every 6 hours PRN Anxiety  hydrOXYzine hydrochloride 100 milliGRAM(s) Oral at bedtime PRN insomnia  ibuprofen  Tablet. 400 milliGRAM(s) Oral every 6 hours PRN Mild Pain (1 - 3)  magnesium hydroxide Suspension 30 milliLiter(s) Oral once PRN Constipation  methocarbamol 500 milliGRAM(s) Oral every 6 hours PRN muscle pain  PHENobarbital 32.4 milliGRAM(s) Oral every 4 hours PRN Withdrawal  pseudoephedrine 60 milliGRAM(s) Oral every 6 hours PRN Rhinitis  trimethobenzamide 300 milliGRAM(s) Oral every 6 hours PRN Nausea and/or Vomiting  trimethobenzamide Injectable 200 milliGRAM(s) IntraMuscular every 6 hours PRN Nausea and/or Vomiting      T(C): 35.8 (08-27-19 @ 06:00), Max: 36.9 (08-27-19 @ 00:00)  HR: 50 (08-27-19 @ 06:00) (50 - 70)  BP: 91/51 (08-27-19 @ 06:00) (91/51 - 137/72)  RR: 16 (08-27-19 @ 06:00) (14 - 16)  SpO2: --    PHYSICAL EXAM:      Constitutional: NAD, A&O x3    Eyes: PERRLA, no conjuctivitis    Neck: no lymphadenopathy    Respiratory: +air entry, no rales, no rhonchi, no wheezes    Cardiovascular: +S1 and S2, regular rate and rhythm    Gastrointestinal: +BS, soft, non-tender, not distended    Extremities:  no edema, no calf tenderness    Skin: no rashes, normal turgor                        Impression and Plan:    Primary Diagnosis:  Benzo/Opiate Dependency                                Medication: Pheno/Methadone Protocol    Secondary Diagnosis:                                                                                Medication:    Tertiary Diagnosis:                                                                                     Medication:      Continue Detox Protocols. Use of PRNS as needed for withdrawal and comfort.    Adjustments to protocols:    Labs/ Tests reviewed.    Tests ordered:     Likely Disposition: _x__Home       ___Rehab       ___Outpatient Program    ___Self Help     _____Other    Estimated Length of stay:__5__

## 2019-08-28 LAB
GAMMA INTERFERON BACKGROUND BLD IA-ACNC: 0.02 IU/ML — SIGNIFICANT CHANGE UP
M TB IFN-G BLD-IMP: NEGATIVE — SIGNIFICANT CHANGE UP
M TB IFN-G CD4+ BCKGRND COR BLD-ACNC: 0 IU/ML — SIGNIFICANT CHANGE UP
M TB IFN-G CD4+CD8+ BCKGRND COR BLD-ACNC: 0 IU/ML — SIGNIFICANT CHANGE UP
QUANT TB PLUS MITOGEN MINUS NIL: 4.68 IU/ML — SIGNIFICANT CHANGE UP

## 2019-08-29 DIAGNOSIS — F41.1 GENERALIZED ANXIETY DISORDER: ICD-10-CM

## 2019-08-29 DIAGNOSIS — F11.20 OPIOID DEPENDENCE, UNCOMPLICATED: ICD-10-CM

## 2019-08-29 DIAGNOSIS — I10 ESSENTIAL (PRIMARY) HYPERTENSION: ICD-10-CM

## 2019-08-29 DIAGNOSIS — Z87.81 PERSONAL HISTORY OF (HEALED) TRAUMATIC FRACTURE: ICD-10-CM

## 2019-08-29 DIAGNOSIS — F15.20 OTHER STIMULANT DEPENDENCE, UNCOMPLICATED: ICD-10-CM

## 2019-08-29 DIAGNOSIS — F90.9 ATTENTION-DEFICIT HYPERACTIVITY DISORDER, UNSPECIFIED TYPE: ICD-10-CM

## 2019-08-29 DIAGNOSIS — G47.00 INSOMNIA, UNSPECIFIED: ICD-10-CM

## 2019-08-29 DIAGNOSIS — Z88.1 ALLERGY STATUS TO OTHER ANTIBIOTIC AGENTS STATUS: ICD-10-CM

## 2019-08-29 DIAGNOSIS — F13.20 SEDATIVE, HYPNOTIC OR ANXIOLYTIC DEPENDENCE, UNCOMPLICATED: ICD-10-CM

## 2019-08-29 LAB — T PALLIDUM AB TITR SER: NEGATIVE — SIGNIFICANT CHANGE UP

## 2019-09-01 ENCOUNTER — OUTPATIENT (OUTPATIENT)
Dept: OUTPATIENT SERVICES | Facility: HOSPITAL | Age: 29
LOS: 1 days | End: 2019-09-01
Payer: MEDICAID

## 2019-09-01 DIAGNOSIS — S82.892A OTHER FRACTURE OF LEFT LOWER LEG, INITIAL ENCOUNTER FOR CLOSED FRACTURE: Chronic | ICD-10-CM

## 2019-09-01 PROCEDURE — G9001: CPT

## 2019-09-12 DIAGNOSIS — Z71.89 OTHER SPECIFIED COUNSELING: ICD-10-CM

## 2019-09-12 PROBLEM — F41.9 ANXIETY DISORDER, UNSPECIFIED: Chronic | Status: ACTIVE | Noted: 2019-08-23

## 2019-09-12 PROBLEM — G47.00 INSOMNIA, UNSPECIFIED: Chronic | Status: ACTIVE | Noted: 2019-08-23

## 2019-09-12 PROBLEM — F90.9 ATTENTION-DEFICIT HYPERACTIVITY DISORDER, UNSPECIFIED TYPE: Chronic | Status: ACTIVE | Noted: 2019-08-23

## 2019-09-12 PROBLEM — S82.892A OTHER FRACTURE OF LEFT LOWER LEG, INITIAL ENCOUNTER FOR CLOSED FRACTURE: Chronic | Status: ACTIVE | Noted: 2019-08-23

## 2019-09-12 PROBLEM — F11.20 OPIOID DEPENDENCE, UNCOMPLICATED: Chronic | Status: ACTIVE | Noted: 2019-08-23

## 2019-09-12 PROBLEM — I10 ESSENTIAL (PRIMARY) HYPERTENSION: Chronic | Status: ACTIVE | Noted: 2019-08-23

## 2019-10-08 ENCOUNTER — EMERGENCY (EMERGENCY)
Facility: HOSPITAL | Age: 29
LOS: 0 days | Discharge: HOME | End: 2019-10-08
Attending: EMERGENCY MEDICINE | Admitting: EMERGENCY MEDICINE
Payer: MEDICAID

## 2019-10-08 VITALS
RESPIRATION RATE: 18 BRPM | OXYGEN SATURATION: 100 % | HEART RATE: 81 BPM | SYSTOLIC BLOOD PRESSURE: 138 MMHG | DIASTOLIC BLOOD PRESSURE: 77 MMHG | TEMPERATURE: 97 F

## 2019-10-08 DIAGNOSIS — F11.10 OPIOID ABUSE, UNCOMPLICATED: ICD-10-CM

## 2019-10-08 DIAGNOSIS — S82.892A OTHER FRACTURE OF LEFT LOWER LEG, INITIAL ENCOUNTER FOR CLOSED FRACTURE: Chronic | ICD-10-CM

## 2019-10-08 DIAGNOSIS — F17.200 NICOTINE DEPENDENCE, UNSPECIFIED, UNCOMPLICATED: ICD-10-CM

## 2019-10-08 DIAGNOSIS — Z88.8 ALLERGY STATUS TO OTHER DRUGS, MEDICAMENTS AND BIOLOGICAL SUBSTANCES STATUS: ICD-10-CM

## 2019-10-08 PROCEDURE — 99282 EMERGENCY DEPT VISIT SF MDM: CPT

## 2019-10-08 NOTE — ED PROVIDER NOTE - PHYSICAL EXAMINATION
VITAL SIGNS: I have reviewed nursing notes and confirm.  CONSTITUTIONAL: Well-developed; well-nourished; in no acute distress. pt comfortable and calmed.   SKIN: skin exam is warm and dry, no acute rash. no shaking, no sweating.   HEAD: Normocephalic; atraumatic.  EYES:  EOM intact; conjunctiva and sclera clear.  ENT: No nasal discharge; airway clear. moist oral mucosa;   NECK: Supple; non tender.  CARD: S1, S2 normal; no murmurs, gallops, or rubs. Regular rate and rhythm. posterior tibial and radial pulses 2+  RESP: No wheezes, rales or rhonchi. cta b/l. no use of accessory muscles. no retractions  ABD: Normal bowel sounds; soft; non-distended; non-tender; no rebound.   EXT: Normal ROM. No  cyanosis or edema.  BACK: No cva tenderness  LYMPH: No acute cervical adenopathy.  NEURO: Alert, oriented, grossly unremarkable.    PSYCH: Cooperative, appropriate.

## 2019-10-08 NOTE — ED PROVIDER NOTE - CLINICAL SUMMARY MEDICAL DECISION MAKING FREE TEXT BOX
28yo man h/o substance abuse, has been on suboxone in the past, requesting same. Not currently withdrawing. Feels well. Not interested in rehab admission. VS, exam as noted. Explained suboxone protocol, and that meds can not be given without ssx of withdrawal. Pt understands and will f/u in outpt rehab.

## 2019-10-08 NOTE — ED PROVIDER NOTE - PATIENT PORTAL LINK FT
You can access the FollowMyHealth Patient Portal offered by [facility] by registering at the following website: [email or web address]. By joining [employer]’s FollowMyHealth portal, you will also be able to view your health information using other applications (apps) compatible with our system.

## 2019-10-08 NOTE — ED PROVIDER NOTE - ATTENDING CONTRIBUTION TO CARE
30yo man h/o substance abuse, has been on suboxone in the past, requesting same. Not currently withdrawing. Feels well. Not interested in rehab admission. VS, exam as noted. Explained suboxone protocol, and that meds can not be given without ssx of withdrawal. Pt understands and will f/u in outpt rehab.

## 2019-10-08 NOTE — ED ADULT NURSE NOTE - OBJECTIVE STATEMENT
Pt states pt takes oxycodone and xanax and requesting prescription for Suboxone. Last dose taken yesterday. Denies any pain at this time

## 2019-10-08 NOTE — ED ADULT NURSE NOTE - PMH
ADHD    Ankle fracture, left  8/20/2019  Anxiety    HTN (hypertension)    Insomnia    Opioid dependence on agonist therapy

## 2019-10-08 NOTE — ED PROVIDER NOTE - OBJECTIVE STATEMENT
30y/o M w/ hx of oxycodone use has been on suboxone in the past.  pt not interested in rehab, but wants to be started on suboxone.  pt denies cp or sob. pt just feels hot flashes on and off.  last time he used was a yesterday. pt was recently on detox in august. 28y/o M w/ hx of oxycodone use has been on suboxone in the past.  pt not interested in rehab, but wants to be started on suboxone.  pt denies cp or sob. pt just feels hot flashes on and off.  last time he used was a yesterday. pt was recently on detox in august. no vomiting or diarrhea.

## 2020-09-22 PROBLEM — Z00.00 ENCOUNTER FOR PREVENTIVE HEALTH EXAMINATION: Status: ACTIVE | Noted: 2020-09-22

## 2020-09-28 ENCOUNTER — OUTPATIENT (OUTPATIENT)
Dept: OUTPATIENT SERVICES | Facility: HOSPITAL | Age: 30
LOS: 1 days | Discharge: HOME | End: 2020-09-28
Payer: MEDICAID

## 2020-09-28 ENCOUNTER — TRANSCRIPTION ENCOUNTER (OUTPATIENT)
Age: 30
End: 2020-09-28

## 2020-09-28 ENCOUNTER — APPOINTMENT (OUTPATIENT)
Dept: OTOLARYNGOLOGY | Facility: CLINIC | Age: 30
End: 2020-09-28

## 2020-09-28 DIAGNOSIS — N41.3 PROSTATOCYSTITIS: ICD-10-CM

## 2020-09-28 DIAGNOSIS — S82.892A OTHER FRACTURE OF LEFT LOWER LEG, INITIAL ENCOUNTER FOR CLOSED FRACTURE: Chronic | ICD-10-CM

## 2020-09-28 PROCEDURE — 76856 US EXAM PELVIC COMPLETE: CPT | Mod: 26

## 2020-09-28 PROCEDURE — 76872 US TRANSRECTAL: CPT | Mod: 26

## 2020-10-12 ENCOUNTER — APPOINTMENT (OUTPATIENT)
Dept: UROLOGY | Facility: CLINIC | Age: 30
End: 2020-10-12

## 2021-07-05 ENCOUNTER — EMERGENCY (EMERGENCY)
Facility: HOSPITAL | Age: 31
LOS: 0 days | Discharge: HOME | End: 2021-07-05
Attending: EMERGENCY MEDICINE | Admitting: EMERGENCY MEDICINE
Payer: MEDICAID

## 2021-07-05 VITALS
OXYGEN SATURATION: 100 % | SYSTOLIC BLOOD PRESSURE: 129 MMHG | HEIGHT: 72 IN | TEMPERATURE: 99 F | DIASTOLIC BLOOD PRESSURE: 87 MMHG | RESPIRATION RATE: 18 BRPM | WEIGHT: 190.04 LBS | HEART RATE: 88 BPM

## 2021-07-05 DIAGNOSIS — M25.561 PAIN IN RIGHT KNEE: ICD-10-CM
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
## 2021-07-05 DIAGNOSIS — S83.91XA SPRAIN OF UNSPECIFIED SITE OF RIGHT KNEE, INITIAL ENCOUNTER: ICD-10-CM

## 2021-07-05 DIAGNOSIS — Z87.39 PERSONAL HISTORY OF OTHER DISEASES OF THE MUSCULOSKELETAL SYSTEM AND CONNECTIVE TISSUE: ICD-10-CM

## 2021-07-05 DIAGNOSIS — S82.892A OTHER FRACTURE OF LEFT LOWER LEG, INITIAL ENCOUNTER FOR CLOSED FRACTURE: Chronic | ICD-10-CM
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
## 2021-07-05 DIAGNOSIS — Z86.69 PERSONAL HISTORY OF OTHER DISEASES OF THE NERVOUS SYSTEM AND SENSE ORGANS: ICD-10-CM

## 2021-07-05 DIAGNOSIS — I10 ESSENTIAL (PRIMARY) HYPERTENSION: ICD-10-CM

## 2021-07-05 DIAGNOSIS — Z86.59 PERSONAL HISTORY OF OTHER MENTAL AND BEHAVIORAL DISORDERS: ICD-10-CM

## 2021-07-05 DIAGNOSIS — Z88.1 ALLERGY STATUS TO OTHER ANTIBIOTIC AGENTS STATUS: ICD-10-CM

## 2021-07-05 DIAGNOSIS — Z79.899 OTHER LONG TERM (CURRENT) DRUG THERAPY: ICD-10-CM

## 2021-07-05 PROCEDURE — 73564 X-RAY EXAM KNEE 4 OR MORE: CPT | Mod: 26,RT

## 2021-07-05 PROCEDURE — 99283 EMERGENCY DEPT VISIT LOW MDM: CPT

## 2021-07-05 RX ORDER — CLONAZEPAM 1 MG
1 TABLET ORAL
Qty: 0 | Refills: 0 | DISCHARGE

## 2021-07-05 RX ORDER — IBUPROFEN 200 MG
800 TABLET ORAL ONCE
Refills: 0 | Status: COMPLETED | OUTPATIENT
Start: 2021-07-05 | End: 2021-07-05

## 2021-07-05 RX ORDER — METHYLPHENIDATE HCL 5 MG
1 TABLET ORAL
Qty: 0 | Refills: 0 | DISCHARGE

## 2021-07-05 RX ORDER — ZOLPIDEM TARTRATE 10 MG/1
1 TABLET ORAL
Qty: 0 | Refills: 0 | DISCHARGE

## 2021-07-05 RX ADMIN — Medication 800 MILLIGRAM(S): at 19:52

## 2021-07-05 NOTE — ED PROVIDER NOTE - PATIENT PORTAL LINK FT
You can access the FollowMyHealth Patient Portal offered by [facility] by registering at the following website: [email or web address]. By joining [employer]’s FollowMyHealth portal, you will also be able to view your health information using other applications (apps) compatible with our system.

## 2021-07-05 NOTE — ED PROVIDER NOTE - CARE PROVIDER_API CALL
Marek Lopez (MD)  Orthopaedic Surgery  [address] [place] [address]  Phone: [phone]  Fax: [phone]  Follow Up Time:

## 2021-07-05 NOTE — ED PROVIDER NOTE - OBJECTIVE STATEMENT
31 year old male past medical history of Hypertension comes to emergency room for  right knee pain after a door was closed on him 2 days ago. patient states he is having persistent pain with movement which prompted him to come in today.

## 2021-07-05 NOTE — ED PROVIDER NOTE - CLINICAL SUMMARY MEDICAL DECISION MAKING FREE TEXT BOX
Pt  pw right knee pain  after  door close don it while at work   =  now wth pain to lateral knee -  pain with extension -  Contours of the joint are normal, no swelling, no crepitus to patella, Full passive  flexion extension , anterior and posterior drawer sign negative with no laxity,  popliteal space no ecchymosis swelling  , ttp right   lateral knee   xray no frx  - knee mobilizer , outpt orhto follow up

## 2021-07-05 NOTE — ED PROVIDER NOTE - PHYSICAL EXAMINATION
Physical Exam    Vital Signs: I have reviewed the initial vital signs.  Constitutional: well-nourished, appears stated age, no acute distress  Musculoskeletal: supple neck, no lower extremity edema, no midline tenderness  +Right knee FROM with some pain and pain on varus stress, neg anterior and posterior drawn sign patient ambulatory in emergency room.   Integumentary: warm, dry, no rash  Neurologic: awake, alert, cranial nerves II-XII grossly intact, extremities’ motor and sensory functions grossly intact  Psychiatric: appropriate mood, appropriate affect

## 2021-07-05 NOTE — ED PROVIDER NOTE - NSFOLLOWUPINSTRUCTIONS_ED_ALL_ED_FT
Follow up with your orthopedic in 1-2 days     Knee Sprain    WHAT YOU NEED TO KNOW:    A knee sprain occurs when one or more ligaments in your knee are suddenly stretched or torn. Ligaments are tissues that hold bones together. Ligaments support the knee and keep the joint and bones in the correct position. Knee Anatomy          DISCHARGE INSTRUCTIONS:    Return to the emergency department if:     Any part of your leg feels cold, numb, or looks pale         Contact your healthcare provider if:     You have new or increased swelling, bruising, or pain in your knee.      Your symptoms do not improve within 6 weeks, even with treatment.      You have questions or concerns about your condition or care.     Medicines:     NSAIDs, such as ibuprofen, help decrease swelling, pain, and fever. This medicine is available with or without a doctor's order. NSAIDs can cause stomach bleeding or kidney problems in certain people. If you take blood thinner medicine, always ask your healthcare provider if NSAIDs are safe for you. Always read the medicine label and follow directions.      Acetaminophen decreases pain and fever. It is available without a doctor's order. Ask how much to take and how often to take it. Follow directions. Read the labels of all other medicines you are using to see if they also contain acetaminophen, or ask your doctor or pharmacist. Acetaminophen can cause liver damage if not taken correctly. Do not use more than 4 grams (4,000 milligrams) total of acetaminophen in one day.       Prescription pain medicine may be given. Ask how to take this medicine safely.       Take your medicine as directed. Contact your healthcare provider if you think your medicine is not helping or if you have side effects. Tell him or her if you are allergic to any medicine. Keep a list of the medicines, vitamins, and herbs you take. Include the amounts, and when and why you take them. Bring the list or the pill bottles to follow-up visits. Carry your medicine list with you in case of an emergency.    Self-care:     Rest your knee and do not exercise. You may be told to keep weight off your knee. This means that you should not walk on your injured leg. Rest helps decrease swelling and allows the injury to heal. You can do gentle range of motion (ROM) exercises as directed. This will prevent stiffness.       Apply ice on your knee for 15 to 20 minutes every hour or as directed. Use an ice pack, or put crushed ice in a plastic bag. Cover it with a towel. Ice helps prevent tissue damage and decreases swelling and pain.      Apply compression to your knee as directed. You may need to wear an elastic bandage. This helps keep your injured knee from moving too much while it heals. You can loosen or tighten the elastic bandage to make it comfortable. It should be tight enough for you to feel support. It should not be so tight that it causes your toes to feel numb or tingly. If you are wearing an elastic bandage, take it off and rewrap it once a day.       Elevate your knee above the level of your heart as often as you can. This will help decrease swelling and pain. Prop your leg on pillows or blankets to keep it elevated comfortably. Do not put pillows directly behind your knee.       Use support devices as directed: Support devices such as a splint or brace may be needed. These devices limit movement and protect your joint while it heals. You may be given crutches to use until you can stand on your injured leg without pain. Use devices as directed.     Physical therapy: A physical therapist teaches you exercises to help improve movement and strength, and to decrease pain.     Prevent another knee sprain: Exercise your legs to keep your muscles strong. Strong leg muscles help protect your knee and prevent strain. The following may also prevent a knee sprain:     Slowly start your exercise or training program. Slowly increase the time, distance, and intensity of your exercise. Sudden increases in training may cause you to injure your knee again.       Wear protective braces and equipment as directed. Braces may prevent your knee from moving the wrong way and causing another sprain. Protective equipment may support your bones and ligaments to prevent injury.       Warm up and stretch before exercise. Warm up by walking or using an exercise bike before starting your regular exercise. Do gentle stretches after warming up. This helps to loosen your muscles and decrease stress on your knee. Cool down and stretch after you exercise.       Wear shoes that fit correctly and support your feet. Replace your running or exercise shoes before the padding or shock absorption is worn out. Ask your healthcare provider which exercise shoes are best for you. Ask if you should wear special shoe inserts. Shoe inserts can help support your heels and arches or keep your foot lined up correctly in your shoes. Exercise on flat surfaces.    Follow up with your healthcare provider as directed: Write down your questions so you remember to ask them during your visits.        © Copyright [employer] 2019 All illustrations and images included in CareNotes are the copyrighted property of [employer]. or [employer].       How to Use a Knee Immobilizer  A knee immobilizer is used to support and protect an injured or painful knee. You may also have to wear it after knee surgery. A knee immobilizer keeps your knee from moving or bending while it is healing. Wear and remove your knee immobilizer only as told by your health care provider.    ImageIn general, your immobilizer should:    Have straps, hooks, or tapes that fasten snugly around your leg.  Not feel too tight or too loose.    What are the risks?  Generally, knee immobilizers are safe to wear. However, problems may occur, including:    Skin irritation that may lead to infection.  Making your condition worse if you wear the immobilizer in the wrong way.    How to use a knee immobilizer  Different immobilizers will have different instructions for use. Your health care provider will show you or tell you:    How to put on the immobilizer.  How to adjust the immobilizer.  When and how often to wear the immobilizer.  How to remove the immobilizer.  If you will need any assistive devices in addition to the immobilizer, such as crutches or a cane.    Follow these instructions at home:  While resting, raise (elevate) your leg above the level of your heart. Pillows can be used for support. Doing this reduces throbbing and helps with healing.  Loosen the immobilizer if your toes tingle or if you notice other symptoms or signs that it is too tight, such as:    Swelling.  Numbness.  Color change in your foot or ankle.  More pain.    Keep the immobilizer clean.  If the immobilizer is not waterproof:    Do not let it get wet.  Cover it with a watertight covering when you take a bath or a shower.    If your health care provider says that you may remove the immobilizer:    Take it off before bathing.  Check for any skin irritation.  Use a towel to dry the area completely before you put the immobilizer back on.    Contact a health care provider if:  Your knee immobilizer breaks or needs to be replaced.  You have more pain or swelling in your knee, foot, or ankle.  Your knee immobilizer is not helping.  Your knee immobilizer makes your knee pain worse.  Summary  A knee immobilizer is used to support and protect an injured or painful knee. You may also have to wear it after knee surgery.  Generally, knee immobilizers are safe to wear.  Different immobilizers will have different instructions for use. Follow the instructions from your health care provider.  Contact your health care provider if you have more swelling or pain, if your knee immobilizer breaks, or if your knee immobilizer does not help your knee pain.  This information is not intended to replace advice given to you by your health care provider. Make sure you discuss any questions you have with your health care provider.

## 2022-12-08 NOTE — ED PROVIDER NOTE - NSFOLLOWUPCLINICS_GEN_ALL_ED_FT
[phone]
[phone]
[facility]  [facility]  [address] [place] [address]  Phone: [phone]  Fax:
